# Patient Record
Sex: MALE | Race: WHITE | NOT HISPANIC OR LATINO | URBAN - METROPOLITAN AREA
[De-identification: names, ages, dates, MRNs, and addresses within clinical notes are randomized per-mention and may not be internally consistent; named-entity substitution may affect disease eponyms.]

---

## 2018-10-09 ENCOUNTER — OFFICE VISIT (OUTPATIENT)
Dept: URBAN - METROPOLITAN AREA CLINIC 45 | Facility: CLINIC | Age: 63
End: 2018-10-09
Payer: COMMERCIAL

## 2018-10-09 PROCEDURE — 92020 GONIOSCOPY: CPT | Performed by: OPHTHALMOLOGY

## 2018-10-09 PROCEDURE — 92133 CPTRZD OPH DX IMG PST SGM ON: CPT | Performed by: OPHTHALMOLOGY

## 2018-10-09 PROCEDURE — 76514 ECHO EXAM OF EYE THICKNESS: CPT | Performed by: OPHTHALMOLOGY

## 2018-10-09 PROCEDURE — 92004 COMPRE OPH EXAM NEW PT 1/>: CPT | Performed by: OPHTHALMOLOGY

## 2018-10-09 RX ORDER — BIMATOPROST 0.1 MG/ML
0.01 % SOLUTION/ DROPS OPHTHALMIC
Qty: 1 | Refills: 11 | Status: INACTIVE
Start: 2018-10-09 | End: 2018-11-12

## 2018-10-09 ASSESSMENT — KERATOMETRY
OD: 39.38
OS: 39.88

## 2018-10-09 ASSESSMENT — VISUAL ACUITY
OS: 20/30
OD: 20/50

## 2018-10-09 ASSESSMENT — INTRAOCULAR PRESSURE
OS: 26
OD: 30

## 2018-10-09 NOTE — IMPRESSION/PLAN
Impression: Primary open-angle glaucoma, bilateral, moderate stage: V72.8183. /540
10/18 OCT 58/80 6/6 Plan: Discussed glaucoma and risk of vision loss without treatment and close follow-up. Discussed risks/benefits/alternatives to treatment. IOP too high for optic nerve appearance. OCT RNFL ordered and reviewed with pt. Start Lumigan QHS OU (sample given to patient today and sent to pharmacy) after CE/IOL OD will check IOP check and HVF 24-2 OU.

## 2018-10-09 NOTE — IMPRESSION/PLAN
Impression: Age-related nuclear cataract, bilateral: H25.13. Plan: OK for ce/iol OD with Goniotomy in [address] 28, RL2. Distance target. Discussed diagnosis of cataracts. Cataracts are limiting vision. Discussed risks, benefits and alternatives to surgery including but not limited to: bleeding, infection, risk of vision loss, loss of the eye, need for other surgery. Patient voiced understanding and wishes to proceed.

## 2018-10-24 ENCOUNTER — TESTING ONLY (OUTPATIENT)
Dept: URBAN - METROPOLITAN AREA CLINIC 45 | Facility: CLINIC | Age: 63
End: 2018-10-24
Payer: COMMERCIAL

## 2018-10-24 DIAGNOSIS — H25.13 AGE-RELATED NUCLEAR CATARACT, BILATERAL: Primary | ICD-10-CM

## 2018-10-24 PROCEDURE — 92025 CPTRIZED CORNEAL TOPOGRAPHY: CPT | Performed by: OPHTHALMOLOGY

## 2018-10-24 PROCEDURE — 92136 OPHTHALMIC BIOMETRY: CPT | Performed by: OPHTHALMOLOGY

## 2018-10-24 ASSESSMENT — PACHYMETRY
OS: 3.20
OS: 24.64
OD: 25.09
OD: 3.76

## 2018-11-05 ENCOUNTER — SURGERY (OUTPATIENT)
Dept: URBAN - METROPOLITAN AREA SURGERY 20 | Facility: SURGERY | Age: 63
End: 2018-11-05
Payer: COMMERCIAL

## 2018-11-05 PROCEDURE — 66984 XCAPSL CTRC RMVL W/O ECP: CPT | Performed by: OPHTHALMOLOGY

## 2018-11-05 PROCEDURE — 66174 TRLUML DIL AQ O/F CAN W/O ST: CPT | Performed by: OPHTHALMOLOGY

## 2018-11-06 ENCOUNTER — POST-OPERATIVE VISIT (OUTPATIENT)
Dept: URBAN - METROPOLITAN AREA CLINIC 45 | Facility: CLINIC | Age: 63
End: 2018-11-06

## 2018-11-06 PROCEDURE — 99024 POSTOP FOLLOW-UP VISIT: CPT | Performed by: OPTOMETRIST

## 2018-11-06 ASSESSMENT — INTRAOCULAR PRESSURE
OS: 12
OD: 26

## 2018-11-12 ENCOUNTER — POST-OPERATIVE VISIT (OUTPATIENT)
Dept: URBAN - METROPOLITAN AREA CLINIC 45 | Facility: CLINIC | Age: 63
End: 2018-11-12

## 2018-11-12 DIAGNOSIS — Z09 ENCNTR FOR F/U EXAM AFT TRTMT FOR COND OTH THAN MALIG NEOPLM: Primary | ICD-10-CM

## 2018-11-12 PROCEDURE — 99024 POSTOP FOLLOW-UP VISIT: CPT | Performed by: OPTOMETRIST

## 2018-11-12 RX ORDER — BIMATOPROST 0.1 MG/ML
0.01 % SOLUTION/ DROPS OPHTHALMIC
Qty: 1 | Refills: 11 | Status: INACTIVE
Start: 2018-11-12 | End: 2019-04-22

## 2018-11-12 ASSESSMENT — INTRAOCULAR PRESSURE
OD: 11
OS: 13

## 2018-12-12 ENCOUNTER — POST-OPERATIVE VISIT (OUTPATIENT)
Dept: URBAN - METROPOLITAN AREA CLINIC 45 | Facility: CLINIC | Age: 63
End: 2018-12-12

## 2018-12-12 PROCEDURE — 99024 POSTOP FOLLOW-UP VISIT: CPT | Performed by: OPTOMETRIST

## 2018-12-12 ASSESSMENT — INTRAOCULAR PRESSURE
OD: 11
OS: 12

## 2018-12-27 ENCOUNTER — OFFICE VISIT (OUTPATIENT)
Dept: URBAN - METROPOLITAN AREA CLINIC 45 | Facility: CLINIC | Age: 63
End: 2018-12-27
Payer: COMMERCIAL

## 2018-12-27 DIAGNOSIS — H26.491 OTHER SECONDARY CATARACT, RIGHT EYE: ICD-10-CM

## 2018-12-27 DIAGNOSIS — H43.812 VITREOUS DEGENERATION, LEFT EYE: ICD-10-CM

## 2018-12-27 PROCEDURE — 99214 OFFICE O/P EST MOD 30 MIN: CPT | Performed by: OPHTHALMOLOGY

## 2018-12-27 ASSESSMENT — VISUAL ACUITY: OS: 20/30

## 2018-12-27 ASSESSMENT — INTRAOCULAR PRESSURE
OD: 11
OS: 11

## 2018-12-27 NOTE — IMPRESSION/PLAN
Impression: Age-related nuclear cataract, left eye: H25.12. Plan: Discussed diagnosis in detail with patient. No treatment is required at this time. Advised patient of condition. Call if [address] worsens.

## 2018-12-27 NOTE — IMPRESSION/PLAN
Impression: Primary open-angle glaucoma, bilateral, moderate stage: A82.1500. /540
10/18 OCT 59/80 6/6 Plan: IOP reasonable. Recommend  patient continue Lumigan QHS OS. Will check IOP check and HVF 24-2 OU in 4 months with Dr Donal Hodgkins.

## 2018-12-27 NOTE — IMPRESSION/PLAN
Impression: Other secondary cataract, right eye: H26.491. Plan: Discussed diagnosis in detail with patient. Advised patient of condition. Will continue to monitor.

## 2019-04-22 ENCOUNTER — OFFICE VISIT (OUTPATIENT)
Dept: URBAN - METROPOLITAN AREA CLINIC 45 | Facility: CLINIC | Age: 64
End: 2019-04-22
Payer: COMMERCIAL

## 2019-04-22 DIAGNOSIS — H04.123 DRY EYE SYNDROME OF BILATERAL LACRIMAL GLANDS: ICD-10-CM

## 2019-04-22 PROCEDURE — 99213 OFFICE O/P EST LOW 20 MIN: CPT | Performed by: OPTOMETRIST

## 2019-04-22 PROCEDURE — 92083 EXTENDED VISUAL FIELD XM: CPT | Performed by: OPTOMETRIST

## 2019-04-22 PROCEDURE — 99202 OFFICE O/P NEW SF 15 MIN: CPT | Performed by: OPTOMETRIST

## 2019-04-22 RX ORDER — BIMATOPROST 0.1 MG/ML
0.01 % SOLUTION/ DROPS OPHTHALMIC
Qty: 1 | Refills: 11 | Status: INACTIVE
Start: 2019-04-22 | End: 2019-05-01

## 2019-04-22 ASSESSMENT — INTRAOCULAR PRESSURE
OD: 16
OS: 22

## 2019-04-22 NOTE — IMPRESSION/PLAN
Impression: Age-related nuclear cataract, left eye: H25.12. Plan: Will continue to observe condition and or symptoms.

## 2019-04-22 NOTE — IMPRESSION/PLAN
Impression: Primary open-angle glaucoma, bilateral, moderate stage: Z19.0276. Plan: IOP is high today, pt has not been taking the lumigan. Pt to restart 1 gtt qhs ou  Educated patient on risks of non-compliance, side effects, benefits and anticipated results, pt understands.  VF done today, stable

## 2019-05-13 ENCOUNTER — OFFICE VISIT (OUTPATIENT)
Dept: URBAN - METROPOLITAN AREA CLINIC 45 | Facility: CLINIC | Age: 64
End: 2019-05-13
Payer: COMMERCIAL

## 2019-05-13 PROCEDURE — 99213 OFFICE O/P EST LOW 20 MIN: CPT | Performed by: OPTOMETRIST

## 2019-05-13 RX ORDER — TRAVOPROST 0.04 MG/ML
0.004 % SOLUTION/ DROPS OPHTHALMIC
Qty: 1 | Refills: 3 | Status: INACTIVE
Start: 2019-05-13 | End: 2019-08-13

## 2019-05-13 RX ORDER — LATANOPROST 50 UG/ML
0.005 % SOLUTION OPHTHALMIC
Qty: 1 | Refills: 6 | Status: INACTIVE
Start: 2019-05-13 | End: 2019-05-13

## 2019-05-13 ASSESSMENT — INTRAOCULAR PRESSURE
OS: 8
OD: 10

## 2019-05-13 NOTE — IMPRESSION/PLAN
Impression: Primary open-angle glaucoma, bilateral, moderate stage: R90.7286. Plan: Pt picked up Lumigan, was using it 1 gtt qhs os. Pt instructed to use ou.  Lumigan was not covered switch to travatan qhs ou

## 2019-08-13 ENCOUNTER — OFFICE VISIT (OUTPATIENT)
Dept: URBAN - METROPOLITAN AREA CLINIC 45 | Facility: CLINIC | Age: 64
End: 2019-08-13
Payer: COMMERCIAL

## 2019-08-13 PROCEDURE — 99213 OFFICE O/P EST LOW 20 MIN: CPT | Performed by: OPTOMETRIST

## 2019-08-13 RX ORDER — TRAVOPROST 0.04 MG/ML
0.004 % SOLUTION/ DROPS OPHTHALMIC
Qty: 1 | Refills: 3 | Status: INACTIVE
Start: 2019-08-13 | End: 2019-08-15

## 2019-08-13 ASSESSMENT — INTRAOCULAR PRESSURE
OD: 16
OS: 16

## 2019-08-13 NOTE — IMPRESSION/PLAN
Impression: Primary open-angle glaucoma, bilateral, moderate stage: Q89.2474.  Plan: IOP is higher today, pt ran out of gtts, pt to restart Travatan z 1 gtt qhs ou

## 2019-08-13 NOTE — IMPRESSION/PLAN
Impression: Other secondary cataract, right eye: H26.491.  Plan: return for yag with Dr Humaira Palacio

## 2020-09-22 ENCOUNTER — OFFICE VISIT (OUTPATIENT)
Dept: URBAN - METROPOLITAN AREA CLINIC 11 | Facility: CLINIC | Age: 65
End: 2020-09-22
Payer: COMMERCIAL

## 2020-09-22 PROCEDURE — 92014 COMPRE OPH EXAM EST PT 1/>: CPT | Performed by: OPTOMETRIST

## 2020-09-22 PROCEDURE — 92133 CPTRZD OPH DX IMG PST SGM ON: CPT | Performed by: OPTOMETRIST

## 2020-09-22 RX ORDER — LATANOPROST 50 UG/ML
0.005 % SOLUTION OPHTHALMIC
Qty: 1 | Refills: 9 | Status: INACTIVE
Start: 2020-09-22 | End: 2020-11-18

## 2020-09-22 ASSESSMENT — INTRAOCULAR PRESSURE
OD: 16
OS: 16

## 2020-09-22 ASSESSMENT — KERATOMETRY
OD: 40.00
OS: 39.88

## 2020-09-22 ASSESSMENT — VISUAL ACUITY: OS: 20/25

## 2020-09-22 NOTE — ASSESSMENT/PLAN
Impression: OCT - OD: Good-thinning; OS: Good-normal Plan: Restart drops. No change in treatment based on test results.

## 2020-09-22 NOTE — IMPRESSION/PLAN
Impression: Age-related nuclear cataract, left eye: H25.12. Plan: Recommend cataract surgery OS.  Refer to MD for Cat sx guillaumeal.

## 2020-09-22 NOTE — IMPRESSION/PLAN
Impression: Primary open-angle glaucoma, bilateral, moderate stage: W63.9678. OCT 9/20 7/7 56/87 Plan: OCT of RNFL ordered and performed today ou. RNFL thinning OU. Pt has not used drops for several months. Rx Latanoprost 1gt qhs ou.  Pt ed on importance of drops and regular follow ups. schedule VF.
f/u 3mns IOP VF

## 2020-11-18 ENCOUNTER — OFFICE VISIT (OUTPATIENT)
Dept: URBAN - METROPOLITAN AREA CLINIC 11 | Facility: CLINIC | Age: 65
End: 2020-11-18
Payer: COMMERCIAL

## 2020-11-18 PROCEDURE — 99214 OFFICE O/P EST MOD 30 MIN: CPT | Performed by: OPHTHALMOLOGY

## 2020-11-18 RX ORDER — LATANOPROST 50 UG/ML
0.005 % SOLUTION OPHTHALMIC
Qty: 1 | Refills: 5 | Status: INACTIVE
Start: 2020-11-18 | End: 2020-12-14

## 2020-11-18 ASSESSMENT — VISUAL ACUITY
OD: 20/30
OS: 20/30

## 2020-11-18 ASSESSMENT — INTRAOCULAR PRESSURE
OD: 14
OS: 13

## 2020-11-18 NOTE — IMPRESSION/PLAN
Impression: Primary open-angle glaucoma, bilateral, moderate stage: T28.7925. /540,  9/20 OCT 56/87 7/7 Plan: Discussed diagnosis with patient. Discussed treatment options. Recommend patient continue Latanoprost QHS OU. Will continue to monitor.  Keep scheduled HVF with Jacky 12/20

## 2020-12-14 ENCOUNTER — OFFICE VISIT (OUTPATIENT)
Dept: URBAN - METROPOLITAN AREA CLINIC 11 | Facility: CLINIC | Age: 65
End: 2020-12-14
Payer: COMMERCIAL

## 2020-12-14 PROCEDURE — 92083 EXTENDED VISUAL FIELD XM: CPT | Performed by: OPTOMETRIST

## 2020-12-14 PROCEDURE — 92012 INTRM OPH EXAM EST PATIENT: CPT | Performed by: OPTOMETRIST

## 2020-12-14 RX ORDER — LATANOPROST 50 UG/ML
0.005 % SOLUTION OPHTHALMIC
Qty: 2.5 | Refills: 5 | Status: INACTIVE
Start: 2020-12-14 | End: 2021-07-29

## 2020-12-14 NOTE — IMPRESSION/PLAN
Impression: Primary open-angle glaucoma, bilateral, moderate stage: V54.8231. /540,  9/20 OCT 56/87 7/7 HVF 12/20 OD: NS? OS: scattered Plan: VF ordered preformed today. VF defects OU. IOP at good level today.  Continue Latanoprost QHS OU. f/u 4 months IOP & Photos

## 2021-07-29 ENCOUNTER — OFFICE VISIT (OUTPATIENT)
Dept: URBAN - METROPOLITAN AREA CLINIC 11 | Facility: CLINIC | Age: 66
End: 2021-07-29
Payer: COMMERCIAL

## 2021-07-29 DIAGNOSIS — H25.12 AGE-RELATED NUCLEAR CATARACT, LEFT EYE: ICD-10-CM

## 2021-07-29 DIAGNOSIS — H40.1132 PRIMARY OPEN-ANGLE GLAUCOMA, BILATERAL, MODERATE STAGE: Primary | ICD-10-CM

## 2021-07-29 PROCEDURE — 99214 OFFICE O/P EST MOD 30 MIN: CPT | Performed by: OPTOMETRIST

## 2021-07-29 PROCEDURE — 92133 CPTRZD OPH DX IMG PST SGM ON: CPT | Performed by: OPTOMETRIST

## 2021-07-29 RX ORDER — LATANOPROST 50 UG/ML
0.005 % SOLUTION OPHTHALMIC
Qty: 2.5 | Refills: 5 | Status: INACTIVE
Start: 2021-07-29 | End: 2021-09-28

## 2021-07-29 ASSESSMENT — KERATOMETRY
OD: 39.50
OS: 40.13

## 2021-07-29 ASSESSMENT — VISUAL ACUITY
OS: 20/30
OD: 20/40

## 2021-07-29 ASSESSMENT — INTRAOCULAR PRESSURE
OS: 27
OD: 13

## 2021-07-29 NOTE — IMPRESSION/PLAN
Impression: Primary open-angle glaucoma, bilateral, moderate stage: Z51.7565. /540,  9/20 OCT 07/21 6/8 56/79 (56/87) HVF 12/20 OD: NS? OS: scattered; photos 07/21 Plan: OCT RNFL and photos of [address] 75 preformed today. RNFL thinning OU. IOP elevated OS today. Pt has not used eye drop for several months.  Restart Latanoprost QHS OU. f/u 4 months IOP & VF

## 2021-08-06 ENCOUNTER — TESTING ONLY (OUTPATIENT)
Dept: URBAN - METROPOLITAN AREA CLINIC 11 | Facility: CLINIC | Age: 66
End: 2021-08-06
Payer: COMMERCIAL

## 2021-08-06 PROCEDURE — 76519 ECHO EXAM OF EYE: CPT | Performed by: OPHTHALMOLOGY

## 2021-08-06 ASSESSMENT — PACHYMETRY
OS: 3.44
OD: 4.13
OS: 24.59
OD: 24.90

## 2021-08-17 ENCOUNTER — OFFICE VISIT (OUTPATIENT)
Dept: URBAN - METROPOLITAN AREA CLINIC 11 | Facility: CLINIC | Age: 66
End: 2021-08-17
Payer: COMMERCIAL

## 2021-08-17 PROCEDURE — 99214 OFFICE O/P EST MOD 30 MIN: CPT | Performed by: OPHTHALMOLOGY

## 2021-08-17 PROCEDURE — 92020 GONIOSCOPY: CPT | Performed by: OPHTHALMOLOGY

## 2021-08-17 ASSESSMENT — INTRAOCULAR PRESSURE
OD: 14
OS: 14

## 2021-08-17 ASSESSMENT — VISUAL ACUITY
OS: 20/30
OD: 20/40

## 2021-08-17 NOTE — IMPRESSION/PLAN
Impression: Primary open-angle glaucoma, bilateral, moderate stage: I02.8846. /540,  9/20 OCT 07/21 6/8 56/79 (56/87) HVF 12/20 OD: NS? OS: scattered; photos 07/21 Plan: Discussed diagnosis with patient. Discussed treatment options. Recommend patient continue Latanoprost QHS OU. Recommend OMNI OS (if unable due to insurance coverage, second choice iStent). Discussed glaucoma and cataracts. Discussed risks, benefits and alternatives to MIGS procedure. Pt voiced understanding and desires to proceed.

## 2021-08-17 NOTE — IMPRESSION/PLAN
Impression: Age-related nuclear cataract, left eye: H25.12. Plan: OK for ce/iol OS with OMNI  in [address] 28, RL2. Distance target. Discussed lens options, Toric/Trifocal. Discussed ORA. Discussed intracameral Dexycu/Moxifloxacin. Pt is a candidate for premium lens. Discussed need for glasses for near vision with standard IOL and possibly Trifocal as well. Discussed diagnosis of cataracts. Cataracts are limiting vision. Discussed risks, benefits and alternatives to surgery including but not limited to: bleeding, infection, risk of vision loss, loss of the eye, need for other surgery. Patient voiced understanding and wishes to proceed.

## 2021-09-20 ENCOUNTER — SURGERY (OUTPATIENT)
Dept: URBAN - METROPOLITAN AREA SURGERY 20 | Facility: SURGERY | Age: 66
End: 2021-09-20
Payer: COMMERCIAL

## 2021-09-20 PROCEDURE — 66174 TRLUML DIL AQ O/F CAN W/O ST: CPT | Performed by: OPHTHALMOLOGY

## 2021-09-21 ENCOUNTER — POST-OPERATIVE VISIT (OUTPATIENT)
Dept: URBAN - METROPOLITAN AREA CLINIC 11 | Facility: CLINIC | Age: 66
End: 2021-09-21
Payer: COMMERCIAL

## 2021-09-21 DIAGNOSIS — Z96.1 PRESENCE OF INTRAOCULAR LENS: Primary | ICD-10-CM

## 2021-09-21 PROCEDURE — 99024 POSTOP FOLLOW-UP VISIT: CPT | Performed by: OPTOMETRIST

## 2021-09-21 ASSESSMENT — INTRAOCULAR PRESSURE
OS: 12
OD: 12

## 2021-09-21 NOTE — IMPRESSION/PLAN
Impression: S/P Cataract Extraction by phacoemulsification with IOL placement; OMNI; DEXYCU OS - 1 Day. Presence of intraocular lens  Z96.1. Post operative instructions reviewed - Plan: No drops - dexycu.  D/C Latanoprost OS continue latanoprost QHS OD.

## 2021-09-28 ENCOUNTER — POST-OPERATIVE VISIT (OUTPATIENT)
Dept: URBAN - METROPOLITAN AREA CLINIC 45 | Facility: CLINIC | Age: 66
End: 2021-09-28
Payer: COMMERCIAL

## 2021-09-28 DIAGNOSIS — Z48.810 ENCOUNTER FOR SURGICAL AFTERCARE FOLLOWING SURGERY ON A SENSE ORGAN: Primary | ICD-10-CM

## 2021-09-28 PROCEDURE — 99024 POSTOP FOLLOW-UP VISIT: CPT | Performed by: OPHTHALMOLOGY

## 2021-09-28 ASSESSMENT — VISUAL ACUITY
OD: 20/30
OS: 20/30

## 2021-09-28 ASSESSMENT — INTRAOCULAR PRESSURE
OD: 12
OS: 20

## 2021-09-28 NOTE — IMPRESSION/PLAN
Impression: S/P Cataract Extraction by phacoemulsification with IOL placement; OMNI; DEXYCU OS - 8 Days. Encounter for surgical aftercare following surgery on a sense organ  Z48.810. Post operative instructions reviewed -Will monitor off of IOP gtts.  Plan: 3 week post op/IOP check/Mrx D/C Latanoprost

## 2021-10-20 ENCOUNTER — POST-OPERATIVE VISIT (OUTPATIENT)
Dept: URBAN - METROPOLITAN AREA CLINIC 11 | Facility: CLINIC | Age: 66
End: 2021-10-20
Payer: COMMERCIAL

## 2021-10-20 PROCEDURE — 99024 POSTOP FOLLOW-UP VISIT: CPT | Performed by: OPHTHALMOLOGY

## 2021-10-20 ASSESSMENT — INTRAOCULAR PRESSURE
OD: 12
OS: 12

## 2021-10-20 ASSESSMENT — VISUAL ACUITY
OD: 20/40
OS: 20/25

## 2021-10-20 NOTE — IMPRESSION/PLAN
Impression: S/P Cataract Extraction by phacoemulsification with IOL placement; OMNI; DEXYCU OS - 30 Days. Encounter for surgical aftercare following surgery on a sense organ  Z48.810. Post operative instructions reviewed -IOP stable Plan: Ok to update Mrx 
4 month DE/HVF and RNFL OCT Advised patient to use artificial tears for comfort.

## 2022-02-21 ENCOUNTER — TESTING ONLY (OUTPATIENT)
Dept: URBAN - METROPOLITAN AREA CLINIC 11 | Facility: CLINIC | Age: 67
End: 2022-02-21
Payer: MEDICARE

## 2022-02-21 PROCEDURE — 92083 EXTENDED VISUAL FIELD XM: CPT | Performed by: OPHTHALMOLOGY

## 2022-03-29 ENCOUNTER — OFFICE VISIT (OUTPATIENT)
Dept: URBAN - METROPOLITAN AREA CLINIC 11 | Facility: CLINIC | Age: 67
End: 2022-03-29
Payer: MEDICARE

## 2022-03-29 PROCEDURE — 92133 CPTRZD OPH DX IMG PST SGM ON: CPT | Performed by: OPHTHALMOLOGY

## 2022-03-29 PROCEDURE — 99214 OFFICE O/P EST MOD 30 MIN: CPT | Performed by: OPHTHALMOLOGY

## 2022-03-29 ASSESSMENT — INTRAOCULAR PRESSURE
OS: 10
OD: 12

## 2022-03-29 NOTE — IMPRESSION/PLAN
Impression: Primary open-angle glaucoma, bilateral, moderate stage: L41.0480. /540,  3/22 OCT [age] 5/6, 3/22 HVF OD: S/I NS OS: CD ; photos 07/21
s/p OMNI OU Plan: Discussed diagnosis with patient. HVF and RNFL reviewed with patient. Discussed treatment options. No gtts needed at this time. Will continue to monitor.

## 2022-03-29 NOTE — IMPRESSION/PLAN
Impression: Other secondary cataract, right eye: H26.491. Plan: Discussed risk/benefit/alternative to yag-capsulotomy, including risk of IOL dislocation and retinal detachment, pt wishes to proceed. ok for yag-cap OD in ASC RL1.

## 2022-08-03 ENCOUNTER — SURGERY (OUTPATIENT)
Dept: URBAN - METROPOLITAN AREA SURGERY 20 | Facility: SURGERY | Age: 67
End: 2022-08-03
Payer: MEDICARE

## 2022-08-03 PROCEDURE — 66821 AFTER CATARACT LASER SURGERY: CPT | Performed by: OPHTHALMOLOGY

## 2022-08-10 ENCOUNTER — POST-OPERATIVE VISIT (OUTPATIENT)
Facility: LOCATION | Age: 67
End: 2022-08-10
Payer: MEDICARE

## 2022-08-10 DIAGNOSIS — Z48.810 ENCOUNTER FOR SURGICAL AFTERCARE FOLLOWING SURGERY ON A SENSE ORGAN: Primary | ICD-10-CM

## 2022-08-10 PROCEDURE — 99024 POSTOP FOLLOW-UP VISIT: CPT | Performed by: OPTOMETRIST

## 2022-08-10 ASSESSMENT — VISUAL ACUITY
OS: 20/30
OD: 20/25

## 2022-08-10 ASSESSMENT — INTRAOCULAR PRESSURE
OD: 14
OS: 14

## 2022-08-10 NOTE — IMPRESSION/PLAN
Impression: S/P YAG Capsulotomy (Yttrium Aluminum [place]) OD - 7 Days. Encounter for surgical aftercare following surgery on a sense organ  Z48.810. Excellent post op course   Post operative instructions reviewed - Plan: Patient continues to heal well from YAG PC. No inflammation present today. VA improved. Monitor. 4 months as scheduled.

## 2022-12-12 ENCOUNTER — OFFICE VISIT (OUTPATIENT)
Facility: LOCATION | Age: 67
End: 2022-12-12
Payer: MEDICARE

## 2022-12-12 DIAGNOSIS — H40.1132 PRIMARY OPEN-ANGLE GLAUCOMA, BILATERAL, MODERATE STAGE: Primary | ICD-10-CM

## 2022-12-12 PROCEDURE — 92083 EXTENDED VISUAL FIELD XM: CPT | Performed by: OPTOMETRIST

## 2022-12-12 PROCEDURE — 99213 OFFICE O/P EST LOW 20 MIN: CPT | Performed by: OPTOMETRIST

## 2022-12-12 ASSESSMENT — INTRAOCULAR PRESSURE
OS: 14
OD: 16

## 2022-12-12 NOTE — IMPRESSION/PLAN
Impression: Primary open-angle glaucoma, bilateral, moderate stage: G18.8069. Plan: VF OS overall stable. Unreliable OD. Will repeat. No changes being made to current treatment at this time. Emphasized and explained compliance. Reassured patient of current condition and treatment and discussed risks of glaucoma progression. Will continue to monitor IOP. RTC: 1 month VF 24-2 OD ONLY & IOP Check. IOP (12/12/22): 16/14 Treatment: S/P YAG Tmax: 30/26 Target: mid to low teens IOP
C/D ratio: OCTG (date):
24-2 (12/12/2022): Unreliable OD/early nasal step-improved from last VF OS Gonio (date): Pachy (date): Allergies: None Surgery: 
Family History: 
Notes: